# Patient Record
Sex: FEMALE | Race: OTHER | NOT HISPANIC OR LATINO | URBAN - METROPOLITAN AREA
[De-identification: names, ages, dates, MRNs, and addresses within clinical notes are randomized per-mention and may not be internally consistent; named-entity substitution may affect disease eponyms.]

---

## 2018-07-26 ENCOUNTER — EMERGENCY (EMERGENCY)
Facility: HOSPITAL | Age: 28
LOS: 1 days | Discharge: HOME | End: 2018-07-26
Attending: EMERGENCY MEDICINE
Payer: COMMERCIAL

## 2018-07-26 VITALS
SYSTOLIC BLOOD PRESSURE: 120 MMHG | HEART RATE: 89 BPM | DIASTOLIC BLOOD PRESSURE: 83 MMHG | TEMPERATURE: 100 F | RESPIRATION RATE: 16 BRPM | OXYGEN SATURATION: 100 %

## 2018-07-26 VITALS
TEMPERATURE: 102 F | DIASTOLIC BLOOD PRESSURE: 71 MMHG | HEART RATE: 110 BPM | SYSTOLIC BLOOD PRESSURE: 113 MMHG | RESPIRATION RATE: 22 BRPM | WEIGHT: 130.07 LBS | OXYGEN SATURATION: 100 %

## 2018-07-26 LAB
ALBUMIN SERPL ELPH-MCNC: 4.9 G/DL — SIGNIFICANT CHANGE UP (ref 3.3–5)
ALP SERPL-CCNC: 40 U/L — SIGNIFICANT CHANGE UP (ref 40–120)
ALT FLD-CCNC: 10 U/L — SIGNIFICANT CHANGE UP (ref 10–45)
ANION GAP SERPL CALC-SCNC: 19 MMOL/L — HIGH (ref 5–17)
APPEARANCE UR: CLEAR — SIGNIFICANT CHANGE UP
AST SERPL-CCNC: 30 U/L — SIGNIFICANT CHANGE UP (ref 10–40)
BACTERIA # UR AUTO: ABNORMAL /HPF
BASOPHILS # BLD AUTO: 0 K/UL — SIGNIFICANT CHANGE UP (ref 0–0.2)
BASOPHILS NFR BLD AUTO: 0.2 % — SIGNIFICANT CHANGE UP (ref 0–2)
BILIRUB SERPL-MCNC: 0.6 MG/DL — SIGNIFICANT CHANGE UP (ref 0.2–1.2)
BILIRUB UR-MCNC: NEGATIVE — SIGNIFICANT CHANGE UP
BUN SERPL-MCNC: 9 MG/DL — SIGNIFICANT CHANGE UP (ref 7–23)
CALCIUM SERPL-MCNC: 9.3 MG/DL — SIGNIFICANT CHANGE UP (ref 8.4–10.5)
CHLORIDE SERPL-SCNC: 98 MMOL/L — SIGNIFICANT CHANGE UP (ref 96–108)
CO2 SERPL-SCNC: 19 MMOL/L — LOW (ref 22–31)
COLOR SPEC: YELLOW — SIGNIFICANT CHANGE UP
CREAT SERPL-MCNC: 0.81 MG/DL — SIGNIFICANT CHANGE UP (ref 0.5–1.3)
DIFF PNL FLD: ABNORMAL
EOSINOPHIL # BLD AUTO: 0 K/UL — SIGNIFICANT CHANGE UP (ref 0–0.5)
EOSINOPHIL NFR BLD AUTO: 0.2 % — SIGNIFICANT CHANGE UP (ref 0–6)
EPI CELLS # UR: SIGNIFICANT CHANGE UP /HPF
GAS PNL BLDV: SIGNIFICANT CHANGE UP
GLUCOSE SERPL-MCNC: 102 MG/DL — HIGH (ref 70–99)
GLUCOSE UR QL: NEGATIVE — SIGNIFICANT CHANGE UP
HCG SERPL-ACNC: <2 MIU/ML — SIGNIFICANT CHANGE UP
HCT VFR BLD CALC: 43.4 % — SIGNIFICANT CHANGE UP (ref 34.5–45)
HGB BLD-MCNC: 13.8 G/DL — SIGNIFICANT CHANGE UP (ref 11.5–15.5)
KETONES UR-MCNC: ABNORMAL
LEUKOCYTE ESTERASE UR-ACNC: ABNORMAL
LYMPHOCYTES # BLD AUTO: 0.6 K/UL — LOW (ref 1–3.3)
LYMPHOCYTES # BLD AUTO: 8.6 % — LOW (ref 13–44)
MCHC RBC-ENTMCNC: 27.8 PG — SIGNIFICANT CHANGE UP (ref 27–34)
MCHC RBC-ENTMCNC: 31.8 GM/DL — LOW (ref 32–36)
MCV RBC AUTO: 87.5 FL — SIGNIFICANT CHANGE UP (ref 80–100)
MONOCYTES # BLD AUTO: 0.6 K/UL — SIGNIFICANT CHANGE UP (ref 0–0.9)
MONOCYTES NFR BLD AUTO: 7.4 % — SIGNIFICANT CHANGE UP (ref 2–14)
NEUTROPHILS # BLD AUTO: 6.3 K/UL — SIGNIFICANT CHANGE UP (ref 1.8–7.4)
NEUTROPHILS NFR BLD AUTO: 83.6 % — HIGH (ref 43–77)
NITRITE UR-MCNC: NEGATIVE — SIGNIFICANT CHANGE UP
PH UR: 7.5 — SIGNIFICANT CHANGE UP (ref 5–8)
PLATELET # BLD AUTO: 202 K/UL — SIGNIFICANT CHANGE UP (ref 150–400)
POTASSIUM SERPL-MCNC: 4.3 MMOL/L — SIGNIFICANT CHANGE UP (ref 3.5–5.3)
POTASSIUM SERPL-SCNC: 4.3 MMOL/L — SIGNIFICANT CHANGE UP (ref 3.5–5.3)
PROT SERPL-MCNC: 8.3 G/DL — SIGNIFICANT CHANGE UP (ref 6–8.3)
PROT UR-MCNC: NEGATIVE — SIGNIFICANT CHANGE UP
RBC # BLD: 4.96 M/UL — SIGNIFICANT CHANGE UP (ref 3.8–5.2)
RBC # FLD: 11.8 % — SIGNIFICANT CHANGE UP (ref 10.3–14.5)
RBC CASTS # UR COMP ASSIST: ABNORMAL /HPF (ref 0–2)
SODIUM SERPL-SCNC: 136 MMOL/L — SIGNIFICANT CHANGE UP (ref 135–145)
SP GR SPEC: 1.02 — SIGNIFICANT CHANGE UP (ref 1.01–1.02)
UROBILINOGEN FLD QL: NEGATIVE — SIGNIFICANT CHANGE UP
WBC # BLD: 7.5 K/UL — SIGNIFICANT CHANGE UP (ref 3.8–10.5)
WBC # FLD AUTO: 7.5 K/UL — SIGNIFICANT CHANGE UP (ref 3.8–10.5)
WBC UR QL: SIGNIFICANT CHANGE UP /HPF (ref 0–5)

## 2018-07-26 PROCEDURE — 71045 X-RAY EXAM CHEST 1 VIEW: CPT | Mod: 26

## 2018-07-26 PROCEDURE — 84132 ASSAY OF SERUM POTASSIUM: CPT

## 2018-07-26 PROCEDURE — 83690 ASSAY OF LIPASE: CPT

## 2018-07-26 PROCEDURE — 99284 EMERGENCY DEPT VISIT MOD MDM: CPT | Mod: 25

## 2018-07-26 PROCEDURE — 82330 ASSAY OF CALCIUM: CPT

## 2018-07-26 PROCEDURE — 81001 URINALYSIS AUTO W/SCOPE: CPT

## 2018-07-26 PROCEDURE — 85027 COMPLETE CBC AUTOMATED: CPT

## 2018-07-26 PROCEDURE — 82947 ASSAY GLUCOSE BLOOD QUANT: CPT

## 2018-07-26 PROCEDURE — 99284 EMERGENCY DEPT VISIT MOD MDM: CPT

## 2018-07-26 PROCEDURE — 87040 BLOOD CULTURE FOR BACTERIA: CPT

## 2018-07-26 PROCEDURE — 96375 TX/PRO/DX INJ NEW DRUG ADDON: CPT

## 2018-07-26 PROCEDURE — 84295 ASSAY OF SERUM SODIUM: CPT

## 2018-07-26 PROCEDURE — 80053 COMPREHEN METABOLIC PANEL: CPT

## 2018-07-26 PROCEDURE — 84145 PROCALCITONIN (PCT): CPT

## 2018-07-26 PROCEDURE — 87150 DNA/RNA AMPLIFIED PROBE: CPT

## 2018-07-26 PROCEDURE — 84702 CHORIONIC GONADOTROPIN TEST: CPT

## 2018-07-26 PROCEDURE — 87086 URINE CULTURE/COLONY COUNT: CPT

## 2018-07-26 PROCEDURE — 96374 THER/PROPH/DIAG INJ IV PUSH: CPT

## 2018-07-26 PROCEDURE — 82435 ASSAY OF BLOOD CHLORIDE: CPT

## 2018-07-26 PROCEDURE — 85014 HEMATOCRIT: CPT

## 2018-07-26 PROCEDURE — 82803 BLOOD GASES ANY COMBINATION: CPT

## 2018-07-26 PROCEDURE — 83605 ASSAY OF LACTIC ACID: CPT

## 2018-07-26 PROCEDURE — 71045 X-RAY EXAM CHEST 1 VIEW: CPT

## 2018-07-26 RX ORDER — SODIUM CHLORIDE 9 MG/ML
1000 INJECTION INTRAMUSCULAR; INTRAVENOUS; SUBCUTANEOUS ONCE
Qty: 0 | Refills: 0 | Status: COMPLETED | OUTPATIENT
Start: 2018-07-26 | End: 2018-07-26

## 2018-07-26 RX ORDER — PIPERACILLIN AND TAZOBACTAM 4; .5 G/20ML; G/20ML
3.38 INJECTION, POWDER, LYOPHILIZED, FOR SOLUTION INTRAVENOUS ONCE
Qty: 0 | Refills: 0 | Status: DISCONTINUED | OUTPATIENT
Start: 2018-07-26 | End: 2018-07-26

## 2018-07-26 RX ORDER — VANCOMYCIN HCL 1 G
1000 VIAL (EA) INTRAVENOUS ONCE
Qty: 0 | Refills: 0 | Status: DISCONTINUED | OUTPATIENT
Start: 2018-07-26 | End: 2018-07-26

## 2018-07-26 RX ORDER — CEFTRIAXONE 500 MG/1
1 INJECTION, POWDER, FOR SOLUTION INTRAMUSCULAR; INTRAVENOUS ONCE
Qty: 0 | Refills: 0 | Status: COMPLETED | OUTPATIENT
Start: 2018-07-26 | End: 2018-07-26

## 2018-07-26 RX ORDER — ACETAMINOPHEN 500 MG
975 TABLET ORAL ONCE
Qty: 0 | Refills: 0 | Status: COMPLETED | OUTPATIENT
Start: 2018-07-26 | End: 2018-07-26

## 2018-07-26 RX ORDER — KETOROLAC TROMETHAMINE 30 MG/ML
15 SYRINGE (ML) INJECTION ONCE
Qty: 0 | Refills: 0 | Status: DISCONTINUED | OUTPATIENT
Start: 2018-07-26 | End: 2018-07-26

## 2018-07-26 RX ADMIN — SODIUM CHLORIDE 2000 MILLILITER(S): 9 INJECTION INTRAMUSCULAR; INTRAVENOUS; SUBCUTANEOUS at 21:03

## 2018-07-26 RX ADMIN — Medication 15 MILLIGRAM(S): at 21:20

## 2018-07-26 RX ADMIN — CEFTRIAXONE 100 GRAM(S): 500 INJECTION, POWDER, FOR SOLUTION INTRAMUSCULAR; INTRAVENOUS at 21:11

## 2018-07-26 RX ADMIN — Medication 975 MILLIGRAM(S): at 21:18

## 2018-07-26 RX ADMIN — SODIUM CHLORIDE 2000 MILLILITER(S): 9 INJECTION INTRAMUSCULAR; INTRAVENOUS; SUBCUTANEOUS at 21:04

## 2018-07-26 NOTE — ED PROVIDER NOTE - CARE PLAN
Principal Discharge DX:	Fever  Secondary Diagnosis:	Abdominal pain Principal Discharge DX:	Fever  Assessment and plan of treatment:	1. You were seen for fever. A copy of your resulted labs, imaging, and findings have been provided to you.  2. Continue to take clindamycin as prescribed by your doctor. Take over the counter motrin 600 mg with food every six hours (do not exceed 3,200 mg in a 24 hour period) and supplement with tylenol 650 mg every six hours (do not exceed 4000 mg of tylenol in a 24 hour period and do not drink alcohol while taking tylenol) between the motrin dosages to have a layered effect. Consult a pharmacist or your primary care doctor with any questions.   3. Follow up with your primary care doctor within 48 hours. Please call [phone] to make an appointment or with any questions you may have.  4. Return immediately to the emergency department for new, persistent, or worsening symptoms or signs. Return immediately to the emergency department if you have chest pain, shortness of breath, loss of consciousness, fever, nausea, vomiting, or skin rash.  Secondary Diagnosis:	Abdominal pain

## 2018-07-26 NOTE — ED PROVIDER NOTE - OBJECTIVE STATEMENT
26 yo previously healthy F p/w 5 hour hx of abdominal pain and BUE and BLE numbness that started after 26 yo previously healthy F p/w 5 hour hx of abdominal pain and BUE and BLE numbness that started after taking clindamycin today. Pt had pharygnitis 2 w/a and was dx via rapid strep test confirmed by cx by PMD and finished a 10 day course of amoxicillin a few days ago, was feeling improved. Today pt woke up and felt worsening of her pharyngitis and that her "throat was closing in" so returned to PMD and was given clindamycin for a "returning Strep infection," then after pt took clindamycin x 1 at 4PM she starting having her abdominal pain and numbness. No hx of sx. LMP now.    ROS positive: BUE and BLE numbness, pharyngitis, abdominal pain, f/c  ROS negative: CP, SOB, hemoptysis, n/v, bloody stool, dysuria, hematuria, vaginal d/c, rash

## 2018-07-26 NOTE — ED ADULT NURSE NOTE - OBJECTIVE STATEMENT
27F aaox4 ambulatory with h/o Strep infection p/w tremors, abd pain and vomiting, on PO antibiotics at home for strep throat, febrile with OT of 102.1 in ED, rigors noted. Lung sounds clear, abd flat, soft, nt/nd.

## 2018-07-26 NOTE — ED PROVIDER NOTE - PHYSICAL EXAMINATION
GENERAL: Well appearing, well nourished, awake, alert and in MILD DISTRESS  ENMT: Airway patent, Nasal mucosa clear. Mouth with MMM. Throat has no vesicles, no oropharyngeal exudates and uvula is midline.  EYES: conjunctiva clear, sclera anicteric, PERRL  CARDIAC: Regular rate, regular rhythm.  Heart sounds S1, S2, no S3, S4. No murmurs, rubs or gallops.  RESPIRATORY: Breath sounds clear and equal in bilateral anterior lung fields, no wheezes/ronchi/stridor; pt breathing and speaking comfortably with no increased WOB, no accessory mm. use, no intercostal retractions, no nasal flaring  GI: MILD TTP IN B SUPRAPUBIC REGION with no rebound or guarding, no RLQ TTP; no ecchymosis, erythema, or lacerations, abdomen soft, non-distended, no rebound or guarding.   extremities: no pedal edema bilat GENERAL: Well appearing, well nourished, awake, alert and in MILD DISTRESS  ENMT: Airway patent, Nasal mucosa clear. Mouth with MMM. Throat has no vesicles, no oropharyngeal exudates and uvula is midline.  EYES: conjunctiva clear, sclera anicteric, PERRL  CARDIAC: Regular rate, regular rhythm.  Heart sounds S1, S2, no S3, S4. No murmurs, rubs or gallops.  RESPIRATORY: Breath sounds clear and equal in bilateral anterior lung fields, no wheezes/ronchi/stridor; pt breathing and speaking comfortably with no increased WOB, no accessory mm. use, no intercostal retractions, no nasal flaring  GI: MILD TTP IN B SUPRAPUBIC REGION with no rebound or guarding, no RLQ TTP; no ecchymosis, erythema, or lacerations, abdomen soft, non-distended, no rebound or guarding.   extremities: no pedal edema bilat  skin: no rashes or petechiae

## 2018-07-26 NOTE — ED PROVIDER NOTE - MEDICAL DECISION MAKING DETAILS
28 yo previously healthy F p/w 5 hour hx of abdominal pain and BUE and BLE numbness that started after taking clindamycin today for recurrence of pharyngitis, pt is a few days s/p 10 day course of amoxicillin for Strep throat. On exam, , /71, T 102.1F, VS otherwise wnl, pt appears in mild distress, has mild suprapubic TTP bilat. labs pending, CXR pending, IVF for tx. will reassess.

## 2018-07-26 NOTE — ED PROVIDER NOTE - ATTENDING CONTRIBUTION TO CARE
Pt dx with recurrent strep, started on clinda, has spiking fever with ab cramps, paresthesia, sore throat.  Appears anxious, mild tachycardia, no murmur, nontender abdomen, dry mm.

## 2018-07-26 NOTE — ED PROVIDER NOTE - PROGRESS NOTE DETAILS
Dr. Majano Note: pt reexamined, no abdominal pain now, nontender on exam (tympanic with gas), tachycardia resolving, appears well, likely for dc. Lisbeth Corrales R2: on reassessment pt states she feels improved, is passing PO challenge with apple juice, appears in NAD. will d/c home with PMD f/u and continue clindamycin abx

## 2018-07-26 NOTE — ED PROVIDER NOTE - PLAN OF CARE
1. You were seen for fever. A copy of your resulted labs, imaging, and findings have been provided to you.  2. Continue to take clindamycin as prescribed by your doctor. Take over the counter motrin 600 mg with food every six hours (do not exceed 3,200 mg in a 24 hour period) and supplement with tylenol 650 mg every six hours (do not exceed 4000 mg of tylenol in a 24 hour period and do not drink alcohol while taking tylenol) between the motrin dosages to have a layered effect. Consult a pharmacist or your primary care doctor with any questions.   3. Follow up with your primary care doctor within 48 hours. Please call [phone] to make an appointment or with any questions you may have.  4. Return immediately to the emergency department for new, persistent, or worsening symptoms or signs. Return immediately to the emergency department if you have chest pain, shortness of breath, loss of consciousness, fever, nausea, vomiting, or skin rash.

## 2018-07-27 LAB
CULTURE RESULTS: SIGNIFICANT CHANGE UP
GRAM STN FLD: SIGNIFICANT CHANGE UP
SPECIMEN SOURCE: SIGNIFICANT CHANGE UP
SPECIMEN SOURCE: SIGNIFICANT CHANGE UP

## 2018-07-28 LAB
-  COAGULASE NEGATIVE STAPHYLOCOCCUS: SIGNIFICANT CHANGE UP
CULTURE RESULTS: SIGNIFICANT CHANGE UP
METHOD TYPE: SIGNIFICANT CHANGE UP
ORGANISM # SPEC MICROSCOPIC CNT: SIGNIFICANT CHANGE UP
ORGANISM # SPEC MICROSCOPIC CNT: SIGNIFICANT CHANGE UP
SPECIMEN SOURCE: SIGNIFICANT CHANGE UP

## 2018-07-28 NOTE — ED POST DISCHARGE NOTE - RESULT SUMMARY
BC: gram + cocci in clusters in 1 bottle. case discussed with Dr. Park, likely contaminate. need to contact pt to inform of BC result. - NILE Gallagher

## 2018-07-28 NOTE — ED POST DISCHARGE NOTE - DETAILS
Attempted to contact patient regarding blood cx results as above. No answer. Unable to leave voice message. - Brian Carranza PA-C Spoke to patient, informed of results. Patient states that she has still felt weak and lethargic with intermittent fevers. PCP switched Clinda to Cefdinir. Discussed that result s is possibly secondary to contaminate but since patient is having persistent symptoms despite 2 abx recommendation is to return to ED today for further eval and possible admission. Patient understands instructions and verbalizes that she will return today - Karishma Farmer PA-C

## 2018-07-29 ENCOUNTER — EMERGENCY (EMERGENCY)
Facility: HOSPITAL | Age: 28
LOS: 1 days | Discharge: HOME | End: 2018-07-29
Attending: EMERGENCY MEDICINE
Payer: COMMERCIAL

## 2018-07-29 VITALS
RESPIRATION RATE: 18 BRPM | HEART RATE: 114 BPM | TEMPERATURE: 98 F | OXYGEN SATURATION: 97 % | SYSTOLIC BLOOD PRESSURE: 121 MMHG | WEIGHT: 130.07 LBS | DIASTOLIC BLOOD PRESSURE: 72 MMHG

## 2018-07-29 VITALS
SYSTOLIC BLOOD PRESSURE: 114 MMHG | DIASTOLIC BLOOD PRESSURE: 81 MMHG | RESPIRATION RATE: 16 BRPM | TEMPERATURE: 99 F | OXYGEN SATURATION: 100 % | HEART RATE: 82 BPM

## 2018-07-29 DIAGNOSIS — J02.9 ACUTE PHARYNGITIS, UNSPECIFIED: ICD-10-CM

## 2018-07-29 DIAGNOSIS — M54.2 CERVICALGIA: ICD-10-CM

## 2018-07-29 LAB
ALBUMIN SERPL ELPH-MCNC: 4.3 G/DL — SIGNIFICANT CHANGE UP (ref 3.3–5)
ALP SERPL-CCNC: 41 U/L — SIGNIFICANT CHANGE UP (ref 40–120)
ALT FLD-CCNC: 11 U/L — SIGNIFICANT CHANGE UP (ref 10–45)
ANION GAP SERPL CALC-SCNC: 15 MMOL/L — SIGNIFICANT CHANGE UP (ref 5–17)
APPEARANCE UR: CLEAR — SIGNIFICANT CHANGE UP
AST SERPL-CCNC: 25 U/L — SIGNIFICANT CHANGE UP (ref 10–40)
BASOPHILS # BLD AUTO: 0 K/UL — SIGNIFICANT CHANGE UP (ref 0–0.2)
BASOPHILS NFR BLD AUTO: 0.2 % — SIGNIFICANT CHANGE UP (ref 0–2)
BILIRUB SERPL-MCNC: 0.3 MG/DL — SIGNIFICANT CHANGE UP (ref 0.2–1.2)
BILIRUB UR-MCNC: NEGATIVE — SIGNIFICANT CHANGE UP
BUN SERPL-MCNC: 6 MG/DL — LOW (ref 7–23)
CALCIUM SERPL-MCNC: 9.4 MG/DL — SIGNIFICANT CHANGE UP (ref 8.4–10.5)
CHLORIDE SERPL-SCNC: 100 MMOL/L — SIGNIFICANT CHANGE UP (ref 96–108)
CO2 SERPL-SCNC: 24 MMOL/L — SIGNIFICANT CHANGE UP (ref 22–31)
COLOR SPEC: SIGNIFICANT CHANGE UP
COMMENT - URINE: SIGNIFICANT CHANGE UP
COMMENT - URINE: SIGNIFICANT CHANGE UP
CREAT SERPL-MCNC: 0.61 MG/DL — SIGNIFICANT CHANGE UP (ref 0.5–1.3)
DIFF PNL FLD: NEGATIVE — SIGNIFICANT CHANGE UP
EOSINOPHIL # BLD AUTO: 0 K/UL — SIGNIFICANT CHANGE UP (ref 0–0.5)
EOSINOPHIL NFR BLD AUTO: 0.9 % — SIGNIFICANT CHANGE UP (ref 0–6)
EPI CELLS # UR: SIGNIFICANT CHANGE UP /HPF
GLUCOSE SERPL-MCNC: 91 MG/DL — SIGNIFICANT CHANGE UP (ref 70–99)
GLUCOSE UR QL: NEGATIVE — SIGNIFICANT CHANGE UP
HCG UR QL: NEGATIVE — SIGNIFICANT CHANGE UP
HCT VFR BLD CALC: 43.6 % — SIGNIFICANT CHANGE UP (ref 34.5–45)
HGB BLD-MCNC: 14 G/DL — SIGNIFICANT CHANGE UP (ref 11.5–15.5)
KETONES UR-MCNC: ABNORMAL
LEUKOCYTE ESTERASE UR-ACNC: NEGATIVE — SIGNIFICANT CHANGE UP
LYMPHOCYTES # BLD AUTO: 1.3 K/UL — SIGNIFICANT CHANGE UP (ref 1–3.3)
LYMPHOCYTES # BLD AUTO: 36.5 % — SIGNIFICANT CHANGE UP (ref 13–44)
MCHC RBC-ENTMCNC: 28.4 PG — SIGNIFICANT CHANGE UP (ref 27–34)
MCHC RBC-ENTMCNC: 32.1 GM/DL — SIGNIFICANT CHANGE UP (ref 32–36)
MCV RBC AUTO: 88.5 FL — SIGNIFICANT CHANGE UP (ref 80–100)
MONOCYTES # BLD AUTO: 0.3 K/UL — SIGNIFICANT CHANGE UP (ref 0–0.9)
MONOCYTES NFR BLD AUTO: 8.5 % — SIGNIFICANT CHANGE UP (ref 2–14)
NEUTROPHILS # BLD AUTO: 2 K/UL — SIGNIFICANT CHANGE UP (ref 1.8–7.4)
NEUTROPHILS NFR BLD AUTO: 53.8 % — SIGNIFICANT CHANGE UP (ref 43–77)
NITRITE UR-MCNC: NEGATIVE — SIGNIFICANT CHANGE UP
PH UR: 7 — SIGNIFICANT CHANGE UP (ref 5–8)
PLATELET # BLD AUTO: 262 K/UL — SIGNIFICANT CHANGE UP (ref 150–400)
POTASSIUM SERPL-MCNC: 3.6 MMOL/L — SIGNIFICANT CHANGE UP (ref 3.5–5.3)
POTASSIUM SERPL-SCNC: 3.6 MMOL/L — SIGNIFICANT CHANGE UP (ref 3.5–5.3)
PROT SERPL-MCNC: 8.1 G/DL — SIGNIFICANT CHANGE UP (ref 6–8.3)
PROT UR-MCNC: NEGATIVE — SIGNIFICANT CHANGE UP
RBC # BLD: 4.93 M/UL — SIGNIFICANT CHANGE UP (ref 3.8–5.2)
RBC # FLD: 11.9 % — SIGNIFICANT CHANGE UP (ref 10.3–14.5)
RBC CASTS # UR COMP ASSIST: SIGNIFICANT CHANGE UP /HPF (ref 0–2)
SODIUM SERPL-SCNC: 139 MMOL/L — SIGNIFICANT CHANGE UP (ref 135–145)
SP GR SPEC: 1.01 — LOW (ref 1.01–1.02)
UROBILINOGEN FLD QL: NEGATIVE — SIGNIFICANT CHANGE UP
WBC # BLD: 3.6 K/UL — LOW (ref 3.8–10.5)
WBC # FLD AUTO: 3.6 K/UL — LOW (ref 3.8–10.5)
WBC UR QL: SIGNIFICANT CHANGE UP /HPF (ref 0–5)

## 2018-07-29 PROCEDURE — 81001 URINALYSIS AUTO W/SCOPE: CPT

## 2018-07-29 PROCEDURE — 99283 EMERGENCY DEPT VISIT LOW MDM: CPT

## 2018-07-29 PROCEDURE — 36415 COLL VENOUS BLD VENIPUNCTURE: CPT

## 2018-07-29 PROCEDURE — 85027 COMPLETE CBC AUTOMATED: CPT

## 2018-07-29 PROCEDURE — 80053 COMPREHEN METABOLIC PANEL: CPT

## 2018-07-29 PROCEDURE — 99284 EMERGENCY DEPT VISIT MOD MDM: CPT

## 2018-07-29 PROCEDURE — 81025 URINE PREGNANCY TEST: CPT

## 2018-07-29 NOTE — ED PROVIDER NOTE - PROGRESS NOTE DETAILS
Navid Molina (Resident): CBC looks well, decreased neutrophils percent relative to prior CBC - if patient has a partially treated UTI, d/w her to continue the Cedfinir - patient will f/u with her PMD and return to ED for worsening pain, or any concerning symptoms - given lesions in mouth, could be coxackie that just has not appeared on palms/soles yet - will be safe to d/c home

## 2018-07-29 NOTE — ED PROVIDER NOTE - OBJECTIVE STATEMENT
28 y/o female presents with positive blood cultures. Per patient, had abd pain, with fever, told she could have a virus on top of her strep throat that PMD diagnosed. 26 y/o female presents with positive blood cultures. Per patient, had abd pain, with fever, told she could have a virus on top of her strep throat that PMD diagnosed. Was on Clindamycin, but switched to Cefdinir (total of 4 days of abx). Stases still feels week. States high temp today of 99.5. States has sore throat and neck pain. No N/V/D, abd pain, numbness. Did not eat much today. Was switched off of Clinda b/c of stomach irritation. 28 y/o female presents with positive blood cultures. Per patient, had abd pain, with fever, told she could have a virus on top of her strep throat that PMD diagnosed. Was on Clindamycin, but switched to Cefdinir (total of 4 days of abx). Stases still feels week. States high temp today of 99.5. States has sore throat and neck pain. No N/V/D, abd pain, numbness. Did not eat much today. Was switched off of Clinda b/c of stomach irritation. Denies any hx of STD. Not currently sexually active, only 1 partner ever. No exposure to STD. LMP yesterday.

## 2018-07-29 NOTE — ED PROVIDER NOTE - GASTROINTESTINAL, MLM
Abdomen soft, non-tender, no guarding. Abdomen soft, mild tenderness in left lower quadrant and L CVAT w/o guarding

## 2018-07-29 NOTE — ED PROVIDER NOTE - ATTENDING CONTRIBUTION TO CARE
28 y/o f with pmxh presents for + blood culture. patient was seen here yesterday for URI symptoms (sore throat) and had urine testing with blood cultures. patient denies any urinary complaints. no vomiting no diarrhea. no abd pain today but after she went home last night developed some abd pain. She was started on clindamycin by her pmd for throat infection and changed to Cefdinir yesterday. + strep throat that PMD diagnosed. Tm 99.5. States has sore throat and neck pain. no headache. no back pain. no recent travel .vacc utd. 1 sexual partner. no STI. still not feeling baseline but states she is improved since onset. LMP yesterday.  Gen.  no acute distress  HEENT:  pharynx + vesicles right side. no exudate. patent airway.  Lungs:  b/l bs  CVS: S1S2   Abd;  soft no guarding. no distention Mild cva on left side  Ext: no edema   Neuro: aaox3 no focal deficit  MSK: 5/5 x 4 ext

## 2018-07-29 NOTE — ED PROVIDER NOTE - CARE PLAN
Principal Discharge DX:	Sore throat  Assessment and plan of treatment:	1) Please return to the ED should you have any new or worsening symptoms, worsening pain, develop fevers of 100.4 or higher, worsening abdominal pain or any concerning symptoms  2) Please follow up with your primary care doctor in 2-3 days.   3) Please continue your antibiotics until completed

## 2018-07-29 NOTE — ED ADULT NURSE NOTE - OBJECTIVE STATEMENT
27 year old female presents to ED for growth in blood cultures that were drawn on Thursday here in ED. Patient came to ED on thursday after seeing PMD and having positive throat culture for strep throat - went home on Clindamycin and developed stomach pain, called PMD and was told to go to ED. 27 year old female presents to ED with parents due to growth in blood cultures that were drawn on Thursday here in ED. Patient came to ED on Thursday after seeing PMD and having positive throat culture for strep throat - went home on Clindamycin and developed stomach pain, called PMD and was told to go to ED. No PMH. Patient received IV abx here and went home on PO Ceftin that she has been taking since with no symptom resolution. Patient endorses fever, chills, stiff neck, sore throat, generalized body aches, decreased PO intake, and lethargy. Patient is still experiencing the same symptoms today. Patient has been taking Tylenol and Motrin every 6 hours since Thursday when she developed a fever - last taken 2pm today. Patient denies nausea, vomiting, diarrhea, cough, Patient also reports being diagnosed/treated for strep throat the first week of July with 10 days of amoxicillin and resolution of symptoms. 20g peripheral IV placed in L ac and labs drawn and sent to lab. Urine collected and sent to lab. Patient undressed and placed into gown, call bell in hand and side rails up with bed in lowest position for safety. blanket provided. Comfort and safety provided. 27 year old female presents to ED with parents due to growth in blood cultures that were drawn on Thursday here in ED. Patient came to ED on Thursday after seeing PMD and having positive throat culture for strep throat - went home on Clindamycin and developed stomach pain, called PMD and was told to go to ED. No PMH. Patient received IV abx here and went home on PO Cefdinir that she has been taking since (4 days) with no symptom resolution. Patient endorses fever, chills, stiff neck, sore throat, generalized body aches, decreased PO intake, and lethargy. Patient is still experiencing the same symptoms today. Patient has been taking Tylenol and Motrin every 6 hours since Thursday when she developed a fever - last taken 2pm today. Patient denies nausea, vomiting, diarrhea, cough, Patient also reports being diagnosed/treated for strep throat the first week of July with 10 days of amoxicillin and resolution of symptoms. 20g peripheral IV placed in L ac and labs drawn and sent to lab. Urine collected and sent to lab. Patient undressed and placed into gown, call bell in hand and side rails up with bed in lowest position for safety. blanket provided. Comfort and safety provided.

## 2018-07-29 NOTE — ED PROVIDER NOTE - PLAN OF CARE
1) Please return to the ED should you have any new or worsening symptoms, worsening pain, develop fevers of 100.4 or higher, worsening abdominal pain or any concerning symptoms  2) Please follow up with your primary care doctor in 2-3 days.   3) Please continue your antibiotics until completed

## 2018-08-01 LAB
CULTURE RESULTS: SIGNIFICANT CHANGE UP
SPECIMEN SOURCE: SIGNIFICANT CHANGE UP